# Patient Record
Sex: FEMALE | Race: BLACK OR AFRICAN AMERICAN | NOT HISPANIC OR LATINO | URBAN - METROPOLITAN AREA
[De-identification: names, ages, dates, MRNs, and addresses within clinical notes are randomized per-mention and may not be internally consistent; named-entity substitution may affect disease eponyms.]

---

## 2021-03-03 ENCOUNTER — IMPORTED ENCOUNTER (OUTPATIENT)
Dept: URBAN - METROPOLITAN AREA CLINIC 31 | Facility: CLINIC | Age: 51
End: 2021-03-03

## 2021-03-03 PROBLEM — H18.623: Noted: 2021-03-03

## 2021-03-03 PROCEDURE — 92025 CPTRIZED CORNEAL TOPOGRAPHY: CPT

## 2021-03-03 PROCEDURE — 99203 OFFICE O/P NEW LOW 30 MIN: CPT

## 2021-03-03 PROCEDURE — 92015 DETERMINE REFRACTIVE STATE: CPT

## 2021-03-03 NOTE — PATIENT DISCUSSION
Keratoconus can be caused by eye rubbing or can be inherited. Explained importance of no eye rubbing to prevent progression. Condition is worsening. Treatment options discussed include glasses contact lens  and collagen cross linking. Risks and benefits of procedure reviewed including infection and haze. Schedule Collagen Crosslinking OD t/c OS if progresses.

## 2021-06-02 ENCOUNTER — IMPORTED ENCOUNTER (OUTPATIENT)
Dept: URBAN - METROPOLITAN AREA CLINIC 31 | Facility: CLINIC | Age: 51
End: 2021-06-02

## 2021-06-02 PROBLEM — H18.621: Noted: 2021-06-02

## 2021-06-07 ENCOUNTER — IMPORTED ENCOUNTER (OUTPATIENT)
Dept: URBAN - METROPOLITAN AREA CLINIC 31 | Facility: CLINIC | Age: 51
End: 2021-06-07

## 2021-06-07 PROCEDURE — 99024 POSTOP FOLLOW-UP VISIT: CPT

## 2021-08-13 ENCOUNTER — IMPORTED ENCOUNTER (OUTPATIENT)
Dept: URBAN - METROPOLITAN AREA CLINIC 31 | Facility: CLINIC | Age: 51
End: 2021-08-13

## 2021-08-13 PROBLEM — Z98.89: Noted: 2021-08-13

## 2021-08-13 PROCEDURE — 99024 POSTOP FOLLOW-UP VISIT: CPT

## 2021-08-13 PROCEDURE — 92025 CPTRIZED CORNEAL TOPOGRAPHY: CPT

## 2021-09-24 ENCOUNTER — IMPORTED ENCOUNTER (OUTPATIENT)
Dept: URBAN - METROPOLITAN AREA CLINIC 31 | Facility: CLINIC | Age: 51
End: 2021-09-24

## 2021-09-24 PROBLEM — H18.622: Noted: 2021-09-24

## 2021-09-24 PROBLEM — Z98.89: Noted: 2021-09-24

## 2021-09-24 PROBLEM — H18.601: Noted: 2021-09-24

## 2021-09-24 PROCEDURE — 92025 CPTRIZED CORNEAL TOPOGRAPHY: CPT

## 2021-09-24 PROCEDURE — 99213 OFFICE O/P EST LOW 20 MIN: CPT

## 2021-09-24 NOTE — PATIENT DISCUSSION
Keratoconus can be caused by eye rubbing or can be inherited. Explained importance of no eye rubbing to prevent progression. Condition is worsening. Treatment options discussed include glasses contact lens  and collagen cross linking. Risks and benefits of procedure reviewed including infection and haze.  Schedule CCLx OS

## 2022-01-12 ENCOUNTER — IMPORTED ENCOUNTER (OUTPATIENT)
Dept: URBAN - METROPOLITAN AREA CLINIC 31 | Facility: CLINIC | Age: 52
End: 2022-01-12

## 2022-01-12 PROBLEM — H18.622: Noted: 2022-01-12

## 2022-01-18 ENCOUNTER — IMPORTED ENCOUNTER (OUTPATIENT)
Dept: URBAN - METROPOLITAN AREA CLINIC 31 | Facility: CLINIC | Age: 52
End: 2022-01-18

## 2022-01-18 PROBLEM — Z98.89: Noted: 2022-01-18

## 2022-01-18 PROCEDURE — 99024 POSTOP FOLLOW-UP VISIT: CPT

## 2022-02-08 ENCOUNTER — IMPORTED ENCOUNTER (OUTPATIENT)
Dept: URBAN - METROPOLITAN AREA CLINIC 31 | Facility: CLINIC | Age: 52
End: 2022-02-08

## 2022-02-08 PROBLEM — Z98.89: Noted: 2022-02-08

## 2022-02-08 PROCEDURE — 99024 POSTOP FOLLOW-UP VISIT: CPT

## 2022-02-08 PROCEDURE — 92025 CPTRIZED CORNEAL TOPOGRAPHY: CPT

## 2022-02-08 NOTE — PATIENT DISCUSSION
Post Operative: Doing well po drops as instructed. PRED 2x/d for 2 weeks then qd. tears prn Call with any problems. Return for an appointment in 6 weeks for post op exam. MRx and Topography. with Dr. Selam Pichardo.

## 2022-03-22 ENCOUNTER — IMPORTED ENCOUNTER (OUTPATIENT)
Dept: URBAN - METROPOLITAN AREA CLINIC 31 | Facility: CLINIC | Age: 52
End: 2022-03-22

## 2022-03-22 PROBLEM — Z98.89: Noted: 2022-03-22

## 2022-03-22 PROCEDURE — 99024 POSTOP FOLLOW-UP VISIT: CPT

## 2022-03-22 PROCEDURE — 92025 CPTRIZED CORNEAL TOPOGRAPHY: CPT

## 2022-03-22 NOTE — PATIENT DISCUSSION
Post Operative:  Doing well po drops as instructed PRED qd for 2 weeks then dc. tears prn. Call with any problems. follow up with dr Bonnita Sacks in 1 month for MRX new glasses and glaucoma checkReturn for an appointment in 3 months for office call. MRx and Topography. with Dr. Steffi Peterson.

## 2022-04-01 ASSESSMENT — VISUAL ACUITY
OS_CC: 20/50
OS_SC: J3
OD_CC: 20/200
OD_SC: J7
OS_SC: 20/40-2
OD_SC: 20/30+2
OS_SC: 20/40+2
OS_CC: 20/40-1
OD_CC: 20/30+2
OS_PH: CC 20/25 +1
OD_CC: 20/40-1
OU_SC: J314''
OD_SC: 20/30
OS_SC: 20/40
OS_CC: 20/30-2
OS_PH: CC 20/30 +2
OS_SC: 20/40-2
OD_PH: CC 20/30
OD_SC: 20/50-2
OD_SC: 20/40-2
OD_PH: CC 20/25 -2

## 2022-04-01 ASSESSMENT — TONOMETRY
OS_IOP_MMHG: 14
OS_IOP_MMHG: 19
OD_IOP_MMHG: 19
OD_IOP_MMHG: 18
OS_IOP_MMHG: 18
OD_IOP_MMHG: 11

## 2022-06-15 ENCOUNTER — APPOINTMENT (RX ONLY)
Dept: URBAN - METROPOLITAN AREA CLINIC 114 | Facility: CLINIC | Age: 52
Setting detail: DERMATOLOGY
End: 2022-06-15

## 2022-06-15 DIAGNOSIS — L81.1 CHLOASMA: ICD-10-CM | Status: INADEQUATELY CONTROLLED

## 2022-06-15 PROCEDURE — ? ADDITIONAL NOTES

## 2022-06-15 PROCEDURE — ? INVENTORY

## 2022-06-15 PROCEDURE — 99203 OFFICE O/P NEW LOW 30 MIN: CPT

## 2022-06-15 PROCEDURE — ? COUNSELING

## 2022-06-15 PROCEDURE — ? TREATMENT REGIMEN

## 2022-06-15 ASSESSMENT — SEVERITY ASSESSMENT: SEVERITY: MILD, SLIGHTLY DARKER THAN THE SURROUNDING NORMAL SKIN

## 2022-06-15 ASSESSMENT — LOCATION SIMPLE DESCRIPTION DERM
LOCATION SIMPLE: RIGHT CHEEK
LOCATION SIMPLE: LEFT CHEEK

## 2022-06-15 ASSESSMENT — LOCATION ZONE DERM: LOCATION ZONE: FACE

## 2022-06-15 ASSESSMENT — LOCATION DETAILED DESCRIPTION DERM
LOCATION DETAILED: LEFT INFERIOR CENTRAL MALAR CHEEK
LOCATION DETAILED: RIGHT INFERIOR CENTRAL MALAR CHEEK

## 2022-06-15 NOTE — PROCEDURE: ADDITIONAL NOTES
Additional Notes: Went over treatment options of hydroquinone, cyspera, and lasers. Patient opted to try Cyspera.
Render Risk Assessment In Note?: no
Detail Level: Detailed

## 2022-07-29 ENCOUNTER — FOLLOW UP (OUTPATIENT)
Dept: URBAN - METROPOLITAN AREA CLINIC 29 | Facility: CLINIC | Age: 52
End: 2022-07-29

## 2022-07-29 DIAGNOSIS — H18.613: ICD-10-CM

## 2022-07-29 PROCEDURE — 99213 OFFICE O/P EST LOW 20 MIN: CPT

## 2022-07-29 PROCEDURE — 92025 CPTRIZED CORNEAL TOPOGRAPHY: CPT

## 2022-07-29 ASSESSMENT — VISUAL ACUITY
OS_CC: 20/25+2
OD_CC: 20/25+2

## 2024-08-20 ENCOUNTER — OFFICE VISIT (OUTPATIENT)
Dept: URBAN - METROPOLITAN AREA CLINIC 63 | Facility: CLINIC | Age: 54
End: 2024-08-20
Payer: COMMERCIAL

## 2024-08-20 ENCOUNTER — DASHBOARD ENCOUNTERS (OUTPATIENT)
Age: 54
End: 2024-08-20

## 2024-08-20 VITALS
HEIGHT: 64 IN | DIASTOLIC BLOOD PRESSURE: 80 MMHG | BODY MASS INDEX: 50.02 KG/M2 | TEMPERATURE: 97.4 F | OXYGEN SATURATION: 99 % | WEIGHT: 293 LBS | HEART RATE: 82 BPM | SYSTOLIC BLOOD PRESSURE: 132 MMHG

## 2024-08-20 DIAGNOSIS — Z12.11 COLON CANCER SCREENING: ICD-10-CM

## 2024-08-20 DIAGNOSIS — K21.9 GASTRIC REFLUX: ICD-10-CM

## 2024-08-20 PROBLEM — 225587003: Status: ACTIVE | Noted: 2024-08-20

## 2024-08-20 PROCEDURE — 99204 OFFICE O/P NEW MOD 45 MIN: CPT

## 2024-08-20 RX ORDER — TRIAMTERENE AND HYDROCHLOROTHIAZIDE 37.5; 25 MG/1; MG/1
TABLET ORAL
Qty: 90 TABLET | Status: ACTIVE | COMMUNITY

## 2024-08-20 NOTE — HPI-TODAY'S VISIT:
This is a very pleasant 53-year-old female who presents to the office with a chief complaint of esophageal reflux. Past medical history is significant for hypertension, obesity.  Past surgical history is significant for vein ligation.  Family history is significant for paternal uncles with prostate cancer, maternal and maternal aunts with gastric cancer, cousin had CRC, diagnosed in her 40s.  Patient is colonoscopy naive. Cologard done in the last few years, does not remember exact date, we do not have these records. Patient is endoscopy naive. Cardiologist: Dr. Hua George, [place] . Patient presents today explaining that she has been having a "discomfort" in her esophagus when she belches, and describes  a "burning" sensation in her epigastric region that comes and goes.  The symptoms have occurred for about 1 year.  Patient  has not tried any over-the-counter meds, and is unaware of aggravating or alleviating factors.  She explains that her PCP  prescribed her an acid suppressing medication for her symptoms although patient does not remember the name, and admits to not  taking it. . Patient presents today explaining that she had a Cologuard done a few years ago through her PCP, but does not remember the exact date.  She explains it was negative, but for future colorectal screenings she would prefer to have a colonoscopy. I explained to the patient that due to her BMI being >50, she will have to be referred to [facility] for her procedure. Referral sent to [facility]. . Patient denies abdominal pain, belching, bloating, constipation, diarrhea, dysphagia, melena, hematochezia, hematemesis, nausea, vomiting, BRBPR, and unintentional weight loss.

## 2024-10-14 NOTE — PATIENT DISCUSSION
1. Keratoconus OD -  can be caused by eye rubbing or can be inherited. Explained importance of no eye rubbing to prevent progression. s/p CCLx OD doing better dc PRED2. Keratoconus can be caused by eye rubbing or can be inherited. Explained importance of no eye rubbing to prevent progression. Condition is worsening. Treatment options discussed include glasses contact lens  and collagen cross linking. Risks and benefits of procedure reviewed including infection and haze.  Schedule CCLx OS no gum bleeding/no nose bleeding